# Patient Record
Sex: FEMALE | Race: BLACK OR AFRICAN AMERICAN | NOT HISPANIC OR LATINO | Employment: PART TIME | URBAN - METROPOLITAN AREA
[De-identification: names, ages, dates, MRNs, and addresses within clinical notes are randomized per-mention and may not be internally consistent; named-entity substitution may affect disease eponyms.]

---

## 2020-09-28 PROBLEM — L02.11 ABSCESS OF NECK: Status: RESOLVED | Noted: 2018-02-28 | Resolved: 2020-09-28

## 2020-09-28 PROBLEM — L02.11 ABSCESS OF NECK: Status: ACTIVE | Noted: 2018-02-28

## 2020-10-15 PROBLEM — M25.572 CHRONIC PAIN OF LEFT ANKLE: Chronic | Status: ACTIVE | Noted: 2020-10-15

## 2020-10-15 PROBLEM — Z74.09 IMPAIRED FUNCTIONAL MOBILITY, BALANCE, GAIT, AND ENDURANCE: Status: ACTIVE | Noted: 2020-10-15

## 2020-10-15 PROBLEM — G89.29 CHRONIC PAIN OF LEFT ANKLE: Chronic | Status: ACTIVE | Noted: 2020-10-15

## 2020-10-15 PROBLEM — M21.41 FLAT FEET, BILATERAL: Chronic | Status: ACTIVE | Noted: 2020-10-15

## 2020-10-15 PROBLEM — M21.42 FLAT FEET, BILATERAL: Chronic | Status: ACTIVE | Noted: 2020-10-15

## 2020-10-29 PROBLEM — T74.31XA ADULT SUBJECT TO EMOTIONAL ABUSE: Chronic | Status: ACTIVE | Noted: 2020-10-29

## 2021-04-20 PROBLEM — F33.1 MODERATE EPISODE OF RECURRENT MAJOR DEPRESSIVE DISORDER: Chronic | Status: ACTIVE | Noted: 2021-04-20

## 2021-04-29 PROBLEM — Z83.719 FAMILY HISTORY OF COLONIC POLYPS: Status: ACTIVE | Noted: 2021-04-29

## 2021-04-29 PROBLEM — Z12.11 COLON CANCER SCREENING: Status: ACTIVE | Noted: 2021-04-29

## 2021-11-15 PROBLEM — S61.213A LACERATION OF LEFT MIDDLE FINGER WITHOUT FOREIGN BODY WITHOUT DAMAGE TO NAIL: Status: ACTIVE | Noted: 2021-11-15

## 2021-11-15 PROBLEM — Z91.013 FISH ALLERGY: Status: ACTIVE | Noted: 2021-11-15

## 2021-11-15 PROBLEM — S61.211A LACERATION OF LEFT INDEX FINGER WITHOUT FOREIGN BODY WITHOUT DAMAGE TO NAIL: Status: ACTIVE | Noted: 2021-11-15

## 2022-04-01 PROBLEM — H92.03 ACUTE EAR PAIN, BILATERAL: Status: ACTIVE | Noted: 2022-04-01

## 2022-11-03 PROBLEM — H93.8X1 EAR FULLNESS, RIGHT: Status: ACTIVE | Noted: 2022-11-03

## 2022-11-03 PROBLEM — J30.2 SEASONAL ALLERGIES: Chronic | Status: ACTIVE | Noted: 2022-11-03

## 2023-03-01 PROBLEM — L25.9 CONTACT DERMATITIS: Status: ACTIVE | Noted: 2023-03-01

## 2023-03-01 PROBLEM — T78.1XXA ADVERSE FOOD REACTION: Status: ACTIVE | Noted: 2023-03-01

## 2023-03-01 PROBLEM — Z88.0 PENICILLIN ALLERGY: Status: ACTIVE | Noted: 2023-03-01

## 2023-03-01 PROBLEM — J31.0 CHRONIC RHINITIS: Status: ACTIVE | Noted: 2023-03-01

## 2023-03-01 PROBLEM — T50.905A DRUG REACTION: Status: ACTIVE | Noted: 2023-03-01

## 2023-03-01 PROBLEM — J30.2 SEASONAL ALLERGIES: Chronic | Status: RESOLVED | Noted: 2022-11-03 | Resolved: 2023-03-01

## 2023-03-10 PROBLEM — J30.81 ALLERGIC RHINITIS DUE TO ANIMAL DANDER: Status: ACTIVE | Noted: 2023-03-10

## 2023-03-10 PROBLEM — J30.89 ALLERGIC RHINITIS DUE TO DUST MITE: Status: ACTIVE | Noted: 2023-03-10

## 2023-03-10 PROBLEM — J30.1 SEASONAL ALLERGIC RHINITIS DUE TO POLLEN: Status: ACTIVE | Noted: 2023-03-10

## 2023-03-15 PROBLEM — Z91.013 FISH ALLERGY: Status: RESOLVED | Noted: 2021-11-15 | Resolved: 2023-03-15

## 2023-06-15 ENCOUNTER — PATIENT MESSAGE (OUTPATIENT)
Dept: ADMINISTRATIVE | Facility: OTHER | Age: 48
End: 2023-06-15

## 2023-08-01 PROBLEM — R06.02 EXERTIONAL SHORTNESS OF BREATH: Status: ACTIVE | Noted: 2023-08-01

## 2023-08-01 PROBLEM — Z91.199 MEDICAL NON-COMPLIANCE: Status: ACTIVE | Noted: 2023-08-01

## 2023-09-29 PROBLEM — H92.03 ACUTE EAR PAIN, BILATERAL: Status: RESOLVED | Noted: 2022-04-01 | Resolved: 2023-09-29

## 2023-11-15 ENCOUNTER — PATIENT MESSAGE (OUTPATIENT)
Dept: ADMINISTRATIVE | Facility: HOSPITAL | Age: 48
End: 2023-11-15
Payer: COMMERCIAL

## 2024-03-25 ENCOUNTER — ON-DEMAND VIRTUAL (OUTPATIENT)
Dept: URGENT CARE | Facility: CLINIC | Age: 49
End: 2024-03-25
Payer: COMMERCIAL

## 2024-03-25 DIAGNOSIS — T78.40XA ALLERGIC REACTION, INITIAL ENCOUNTER: Primary | ICD-10-CM

## 2024-03-25 PROCEDURE — 99213 OFFICE O/P EST LOW 20 MIN: CPT | Mod: 95,,, | Performed by: NURSE PRACTITIONER

## 2024-03-25 RX ORDER — PREDNISONE 20 MG/1
20 TABLET ORAL DAILY
Qty: 3 TABLET | Refills: 0 | Status: SHIPPED | OUTPATIENT
Start: 2024-03-25 | End: 2024-03-28

## 2024-03-25 RX ORDER — EPINEPHRINE 0.3 MG/.3ML
2 INJECTION SUBCUTANEOUS ONCE
Qty: 0.6 ML | Refills: 0 | Status: SHIPPED | OUTPATIENT
Start: 2024-03-25 | End: 2024-03-25
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
## 2024-03-25 NOTE — PATIENT INSTRUCTIONS
Hydration and Rest: Make sure to drink plenty of fluids and get enough rest to support your body's healing process.  Monitoring and Seeking Help: Monitor your condition closely and seek medical attention if you experience any concerns or if your condition worsens.  Over-the-Counter Medications:  Take over-the-counter Pepcid or Zantac as directed for the next 24-72 hours if needed for relief.  If you received a steroid shot and have been prescribed oral steroids like Prednisone or a Medrol Dose Pack, start taking them the following day.  For localized reactions, it's okay to use over-the-counter topical creams like Cortaid and cool compresses to reduce itching.  Take Claritin, Zyrtec, or Allegra twice a day for allergy relief. You can also use Benadryl or Hydroxyzine as needed for itching, but be cautious of potential drowsiness and avoid driving or operating heavy machinery while taking these medications.  Future Preparedness: Keep Benadryl or an Epi-pen with you if prescribed, for future allergy management.  Following these instructions diligently can help manage your symptoms effectively and promote your overall well-being. If you have any questions or concerns about your treatment plan, don't hesitate to reach out to your healthcare provider for clarification and guidance. They can offer personalized advice based on your specific needs and medical history.

## 2024-07-10 ENCOUNTER — PATIENT OUTREACH (OUTPATIENT)
Dept: ADMINISTRATIVE | Facility: HOSPITAL | Age: 49
End: 2024-07-10
Payer: COMMERCIAL

## 2024-07-10 ENCOUNTER — PATIENT MESSAGE (OUTPATIENT)
Dept: ADMINISTRATIVE | Facility: HOSPITAL | Age: 49
End: 2024-07-10
Payer: COMMERCIAL

## 2024-10-08 ENCOUNTER — PATIENT MESSAGE (OUTPATIENT)
Dept: URGENT CARE | Facility: CLINIC | Age: 49
End: 2024-10-08

## 2024-10-24 PROBLEM — R55 SYNCOPE AND COLLAPSE: Status: ACTIVE | Noted: 2024-10-24

## 2024-12-23 PROBLEM — I47.10 SVT (SUPRAVENTRICULAR TACHYCARDIA): Status: ACTIVE | Noted: 2024-12-23

## 2024-12-23 PROBLEM — R00.2 PALPITATIONS: Status: ACTIVE | Noted: 2024-12-23

## 2025-01-28 ENCOUNTER — ON-DEMAND VIRTUAL (OUTPATIENT)
Dept: URGENT CARE | Facility: CLINIC | Age: 50
End: 2025-01-28
Payer: COMMERCIAL

## 2025-01-28 DIAGNOSIS — J01.90 ACUTE BACTERIAL SINUSITIS: Primary | ICD-10-CM

## 2025-01-28 DIAGNOSIS — B96.89 ACUTE BACTERIAL SINUSITIS: Primary | ICD-10-CM

## 2025-01-28 RX ORDER — AZITHROMYCIN 250 MG/1
TABLET, FILM COATED ORAL
Qty: 6 TABLET | Refills: 0 | Status: SHIPPED | OUTPATIENT
Start: 2025-01-28 | End: 2025-02-02
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
## 2025-01-28 NOTE — PATIENT INSTRUCTIONS
Increase fluids and rest  Pedialyte, powerade, gatorade  Take meds as directed  Warm salt water gargles, tea with honey, chlorseptic spray, throat lozenges   Limit foods that are salty, spicy food, limit carbonated drinks, limit acidic drinks  Tylenol or Motrin as directed for fever or body aches    Continue antihistamine (zyrtec or Claritin), Flonase and saline nasal spray    Okay to take Robitussin DM, Mucinex DM, Dayquil/Nyquil as directed    If increased Shortness of breath or difficulty breathing go to the Urgent Care or ER  If symptoms worsening or not improved f/u in Urgent Care or with PCP

## 2025-02-11 ENCOUNTER — ON-DEMAND VIRTUAL (OUTPATIENT)
Dept: URGENT CARE | Facility: CLINIC | Age: 50
End: 2025-02-11
Payer: COMMERCIAL

## 2025-02-11 DIAGNOSIS — J02.9 SORE THROAT: Primary | ICD-10-CM

## 2025-02-11 RX ORDER — LIDOCAINE HYDROCHLORIDE 20 MG/ML
SOLUTION OROPHARYNGEAL EVERY 8 HOURS PRN
Qty: 60 ML | Refills: 0 | Status: SHIPPED | OUTPATIENT
Start: 2025-02-11 | End: 2025-02-14

## 2025-02-11 RX ORDER — PREDNISONE 20 MG/1
20 TABLET ORAL DAILY
Qty: 3 TABLET | Refills: 0 | Status: SHIPPED | OUTPATIENT
Start: 2025-02-11 | End: 2025-02-14
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
## 2025-04-14 PROBLEM — Z45.09 ENCOUNTER FOR LOOP RECORDER CHECK: Status: ACTIVE | Noted: 2025-04-14

## 2025-05-19 ENCOUNTER — PATIENT MESSAGE (OUTPATIENT)
Dept: ADMINISTRATIVE | Facility: HOSPITAL | Age: 50
End: 2025-05-19
Payer: COMMERCIAL

## 2025-06-05 ENCOUNTER — PATIENT OUTREACH (OUTPATIENT)
Dept: ADMINISTRATIVE | Facility: HOSPITAL | Age: 50
End: 2025-06-05
Payer: COMMERCIAL

## 2025-06-05 DIAGNOSIS — Z12.31 BREAST CANCER SCREENING BY MAMMOGRAM: Primary | ICD-10-CM
